# Patient Record
Sex: FEMALE | Race: OTHER | Employment: STUDENT | URBAN - METROPOLITAN AREA
[De-identification: names, ages, dates, MRNs, and addresses within clinical notes are randomized per-mention and may not be internally consistent; named-entity substitution may affect disease eponyms.]

---

## 2024-10-19 ENCOUNTER — APPOINTMENT (EMERGENCY)
Dept: RADIOLOGY | Facility: HOSPITAL | Age: 19
End: 2024-10-19
Payer: COMMERCIAL

## 2024-10-19 ENCOUNTER — HOSPITAL ENCOUNTER (EMERGENCY)
Facility: HOSPITAL | Age: 19
Discharge: HOME | End: 2024-10-19
Payer: COMMERCIAL

## 2024-10-19 VITALS
DIASTOLIC BLOOD PRESSURE: 84 MMHG | TEMPERATURE: 98.7 F | RESPIRATION RATE: 20 BRPM | HEART RATE: 134 BPM | OXYGEN SATURATION: 96 % | SYSTOLIC BLOOD PRESSURE: 145 MMHG

## 2024-10-19 DIAGNOSIS — J18.9 PNEUMONIA: Primary | ICD-10-CM

## 2024-10-19 DIAGNOSIS — R05.9 COUGH: ICD-10-CM

## 2024-10-19 LAB
ALBUMIN SERPL BCG-MCNC: 4.3 G/DL (ref 3.5–5)
ALP SERPL-CCNC: 72 U/L (ref 34–104)
ALT SERPL W P-5'-P-CCNC: 17 U/L (ref 7–52)
ANION GAP SERPL CALCULATED.3IONS-SCNC: 12 MMOL/L (ref 4–13)
AST SERPL W P-5'-P-CCNC: 16 U/L (ref 13–39)
BASOPHILS # BLD AUTO: 0.01 THOUSANDS/ΜL (ref 0–0.1)
BASOPHILS NFR BLD AUTO: 0 % (ref 0–1)
BILIRUB SERPL-MCNC: 0.28 MG/DL (ref 0.2–1)
BUN SERPL-MCNC: 8 MG/DL (ref 5–25)
CALCIUM SERPL-MCNC: 9.3 MG/DL (ref 8.4–10.2)
CHLORIDE SERPL-SCNC: 103 MMOL/L (ref 96–108)
CO2 SERPL-SCNC: 21 MMOL/L (ref 21–32)
CREAT SERPL-MCNC: 0.52 MG/DL (ref 0.6–1.3)
EOSINOPHIL # BLD AUTO: 0.01 THOUSAND/ΜL (ref 0–0.61)
EOSINOPHIL NFR BLD AUTO: 0 % (ref 0–6)
ERYTHROCYTE [DISTWIDTH] IN BLOOD BY AUTOMATED COUNT: 12.2 % (ref 11.6–15.1)
EXT PREGNANCY TEST URINE: NEGATIVE
EXT. CONTROL: NORMAL
FLUAV RNA RESP QL NAA+PROBE: NEGATIVE
FLUBV RNA RESP QL NAA+PROBE: NEGATIVE
GFR SERPL CREATININE-BSD FRML MDRD: 137 ML/MIN/1.73SQ M
GLUCOSE SERPL-MCNC: 107 MG/DL (ref 65–140)
HCT VFR BLD AUTO: 43.2 % (ref 34.8–46.1)
HGB BLD-MCNC: 14.5 G/DL (ref 11.5–15.4)
IMM GRANULOCYTES # BLD AUTO: 0.01 THOUSAND/UL (ref 0–0.2)
IMM GRANULOCYTES NFR BLD AUTO: 0 % (ref 0–2)
LYMPHOCYTES # BLD AUTO: 1.33 THOUSANDS/ΜL (ref 0.6–4.47)
LYMPHOCYTES NFR BLD AUTO: 18 % (ref 14–44)
MCH RBC QN AUTO: 29.7 PG (ref 26.8–34.3)
MCHC RBC AUTO-ENTMCNC: 33.6 G/DL (ref 31.4–37.4)
MCV RBC AUTO: 89 FL (ref 82–98)
MONOCYTES # BLD AUTO: 0.48 THOUSAND/ΜL (ref 0.17–1.22)
MONOCYTES NFR BLD AUTO: 6 % (ref 4–12)
NEUTROPHILS # BLD AUTO: 5.76 THOUSANDS/ΜL (ref 1.85–7.62)
NEUTS SEG NFR BLD AUTO: 76 % (ref 43–75)
NRBC BLD AUTO-RTO: 0 /100 WBCS
PLATELET # BLD AUTO: 336 THOUSANDS/UL (ref 149–390)
PMV BLD AUTO: 9.4 FL (ref 8.9–12.7)
POTASSIUM SERPL-SCNC: 3.7 MMOL/L (ref 3.5–5.3)
PROT SERPL-MCNC: 8 G/DL (ref 6.4–8.4)
RBC # BLD AUTO: 4.88 MILLION/UL (ref 3.81–5.12)
RSV RNA RESP QL NAA+PROBE: NEGATIVE
SARS-COV-2 RNA RESP QL NAA+PROBE: NEGATIVE
SODIUM SERPL-SCNC: 136 MMOL/L (ref 135–147)
WBC # BLD AUTO: 7.6 THOUSAND/UL (ref 4.31–10.16)

## 2024-10-19 PROCEDURE — 85025 COMPLETE CBC W/AUTO DIFF WBC: CPT

## 2024-10-19 PROCEDURE — 93005 ELECTROCARDIOGRAM TRACING: CPT

## 2024-10-19 PROCEDURE — 0241U HB NFCT DS VIR RESP RNA 4 TRGT: CPT

## 2024-10-19 PROCEDURE — 96375 TX/PRO/DX INJ NEW DRUG ADDON: CPT

## 2024-10-19 PROCEDURE — 71046 X-RAY EXAM CHEST 2 VIEWS: CPT

## 2024-10-19 PROCEDURE — 81025 URINE PREGNANCY TEST: CPT

## 2024-10-19 PROCEDURE — 36415 COLL VENOUS BLD VENIPUNCTURE: CPT

## 2024-10-19 PROCEDURE — 80053 COMPREHEN METABOLIC PANEL: CPT

## 2024-10-19 PROCEDURE — 96365 THER/PROPH/DIAG IV INF INIT: CPT

## 2024-10-19 PROCEDURE — 99284 EMERGENCY DEPT VISIT MOD MDM: CPT

## 2024-10-19 PROCEDURE — 99285 EMERGENCY DEPT VISIT HI MDM: CPT

## 2024-10-19 RX ORDER — DOXYCYCLINE 100 MG/1
100 CAPSULE ORAL 2 TIMES DAILY
Qty: 14 CAPSULE | Refills: 0 | Status: SHIPPED | OUTPATIENT
Start: 2024-10-19 | End: 2024-10-26

## 2024-10-19 RX ORDER — ONDANSETRON 2 MG/ML
4 INJECTION INTRAMUSCULAR; INTRAVENOUS ONCE
Status: COMPLETED | OUTPATIENT
Start: 2024-10-19 | End: 2024-10-19

## 2024-10-19 RX ORDER — SODIUM CHLORIDE, SODIUM GLUCONATE, SODIUM ACETATE, POTASSIUM CHLORIDE, MAGNESIUM CHLORIDE, SODIUM PHOSPHATE, DIBASIC, AND POTASSIUM PHOSPHATE .53; .5; .37; .037; .03; .012; .00082 G/100ML; G/100ML; G/100ML; G/100ML; G/100ML; G/100ML; G/100ML
1000 INJECTION, SOLUTION INTRAVENOUS ONCE
Status: COMPLETED | OUTPATIENT
Start: 2024-10-19 | End: 2024-10-19

## 2024-10-19 RX ADMIN — ONDANSETRON 4 MG: 2 INJECTION INTRAMUSCULAR; INTRAVENOUS at 22:48

## 2024-10-19 RX ADMIN — DEXAMETHASONE 10 MG: 4 TABLET ORAL at 22:48

## 2024-10-19 RX ADMIN — SODIUM CHLORIDE, SODIUM GLUCONATE, SODIUM ACETATE, POTASSIUM CHLORIDE, MAGNESIUM CHLORIDE, SODIUM PHOSPHATE, DIBASIC, AND POTASSIUM PHOSPHATE 1000 ML: .53; .5; .37; .037; .03; .012; .00082 INJECTION, SOLUTION INTRAVENOUS at 22:48

## 2024-10-20 LAB
ATRIAL RATE: 132 BPM
P AXIS: 36 DEGREES
PR INTERVAL: 128 MS
QRS AXIS: 73 DEGREES
QRSD INTERVAL: 86 MS
QT INTERVAL: 290 MS
QTC INTERVAL: 429 MS
T WAVE AXIS: 9 DEGREES
VENTRICULAR RATE: 132 BPM

## 2024-10-20 PROCEDURE — 93010 ELECTROCARDIOGRAM REPORT: CPT | Performed by: INTERNAL MEDICINE

## 2024-10-20 NOTE — ED ATTENDING ATTESTATION
10/19/2024  I, Annette Maria Palladino, DO, saw and evaluated the patient. I have discussed the patient with the resident/non-physician practitioner and agree with the resident's/non-physician practitioner's findings, Plan of Care, and MDM as documented in the resident's/non-physician practitioner's note, except where noted. All available labs and Radiology studies were reviewed.  I was present for key portions of any procedure(s) performed by the resident/non-physician practitioner and I was immediately available to provide assistance.       At this point I agree with the current assessment done in the Emergency Department.  I have conducted an independent evaluation of this patient a history and physical is as follows:      Emergency Department Note- Beatrzi Bay 19 y.o. female MRN: [id number]    Unit/Bed#: Z6HA Encounter: [id number]  Chief Complaint   Patient presents with    Cough     Pt. Reports a cough for the past week. Reports a coughing fit an hour ago and coughed up blood, now feeling lightheaded. On and off fevers, +N/V/D.          History of Present Illness   HPI:  Beatriz Bay is a 19 y.o. female who presents for evaluation of URI-like symptoms.  Patient notes her cough for the past week.  Patient notes that had a coughing fit an hour ago and coughed up blood-tinged sputum.  Patient notes feeling slightly lightheaded.  On and off fevers over the past week.  Positive nausea vomiting as well as diarrhea.  Has been eating and drinking normally.  Denies any dysuria or hematuria.  Patient notes last menstrual period was last week.  Denies any chance of pregnancy.  Patient denies any sore throat.    Review of Systems    Historical Information   Past Medical History:   Diagnosis Date    Anemia      History reviewed. No pertinent surgical history.  Social History   Social History     Substance and Sexual Activity   Alcohol Use None     Social History     Substance and Sexual  Activity   Drug Use Not on file     Social History     Tobacco Use   Smoking Status Not on file   Smokeless Tobacco Not on file     Family History: Family history non-contributory    Meds/Allergies   all medications and allergies reviewed  No Known Allergies    Objective   First Vitals:   Blood Pressure: 145/84 (10/19/24 2109)  Pulse: (!) 134 (10/19/24 2109)  Temperature: 98.7 °F (37.1 °C) (10/19/24 2108)  Temp Source: Tympanic (10/19/24 2108)  Respirations: 20 (10/19/24 2109)  SpO2: 96 % (10/19/24 2109)    Current Vitals:   Blood Pressure: 145/84 (10/19/24 2109)  Pulse: (!) 134 (10/19/24 2109)  Temperature: 98.7 °F (37.1 °C) (10/19/24 2108)  Temp Source: Tympanic (10/19/24 2108)  Respirations: 20 (10/19/24 2109)  SpO2: 96 % (10/19/24 2109)    No intake or output data in the 24 hours ending 10/19/24 2252    Invasive Devices       Peripheral Intravenous Line  Duration             Peripheral IV 10/19/24 Left;Proximal;Ventral (anterior) Forearm <1 day                    Physical Exam      Medical Decision Making:   Viral illness - symptoms consistent with upper respiratory infection, likely viral in etiology. Clinically well hydrated on arrival. No signs of respiratory distress. Discussed nasal clearance. May use saline spray. Tylenol and motrin recommended as needed for fever. Encourage fluids.  Screening labs obtained.  Point-of-care pregnancy obtained Covid FLU RSV pending. Advised to follow up with PCP in a few days for re-evaluation. OTC medications recommended for symptomatic relief. Return precautions given. Patient tolerating PO.  All questions and concerns answered. Stable for discharge.      Recent Results (from the past 36 hour(s))   ECG 12 lead    Collection Time: 10/19/24  9:11 PM   Result Value Ref Range    Ventricular Rate 132 BPM    Atrial Rate 132 BPM    NJ Interval 128 ms    QRSD Interval 86 ms    QT Interval 290 ms    QTC Interval 429 ms    P Axis 36 degrees    QRS Axis 73 degrees    T Wave Axis 9  "degrees     XR chest 2 views    (Results Pending)         Portions of the record may have been created with voice recognition software. Occasional wrong word or \"sound a like\" substitutions may have occurred due to the inherent limitations of voice recognition software.  Read the chart carefully and recognize, using context, where substitutions have occurred.      Critical Care Time  Procedures      "

## 2024-10-20 NOTE — ED PROVIDER NOTES
Chief Complaint   Patient presents with    Cough     Pt. Reports a cough for the past week. Reports a coughing fit an hour ago and coughed up blood, now feeling lightheaded. On and off fevers, +N/V/D.      History of Present Illness and Review of Systems   This is a 19 y.o. female with no significant past medical history comes in for evaluation of hematemesis that occurred after a posttussive fit.  The patient states that she has been under the weather for the past few days.  She has been nauseous and vomiting however she has not had any blood up to this point.  The patient states that there was only a small amount of blood about a tablespoon that was seen in the vomit.  Patient did not eat any red-tinged food prior to the emesis.  She denies chest pain or shortness of breath at this time.  No other complaints for this encounter.    Remainder of ROS Reviewed and Non-Pertinent    Past Medical, Past Surgical History:    has a past medical history of Anemia.   has no past surgical history on file.     Allergies:   No Known Allergies    Social and Family History:     Social History     Substance and Sexual Activity   Alcohol Use None     Social History     Tobacco Use   Smoking Status Not on file   Smokeless Tobacco Not on file     Social History     Substance and Sexual Activity   Drug Use Not on file       Physical Examination     Vitals:    10/19/24 2108 10/19/24 2109   BP:  145/84   BP Location:  Left arm   Pulse:  (!) 134   Resp:  20   Temp: 98.7 °F (37.1 °C)    TempSrc: Tympanic    SpO2:  96%       Physical Exam  Vitals and nursing note reviewed.   Constitutional:       General: She is not in acute distress.     Appearance: She is well-developed.   HENT:      Head: Normocephalic and atraumatic.   Eyes:      Conjunctiva/sclera: Conjunctivae normal.   Cardiovascular:      Rate and Rhythm: Regular rhythm. Tachycardia present.      Heart sounds: No murmur heard.  Pulmonary:      Effort: Pulmonary effort is normal. No  respiratory distress.      Breath sounds: Normal breath sounds. No wheezing, rhonchi or rales.   Chest:      Chest wall: No tenderness.   Abdominal:      Palpations: Abdomen is soft.      Tenderness: There is no abdominal tenderness.   Musculoskeletal:         General: No swelling.      Cervical back: Neck supple.   Skin:     General: Skin is warm and dry.      Capillary Refill: Capillary refill takes less than 2 seconds.   Neurological:      Mental Status: She is alert.   Psychiatric:         Mood and Affect: Mood normal.           Procedures   Procedures      MDM:   Medical Decision Making  Amount and/or Complexity of Data Reviewed  Labs: ordered.  Radiology: ordered and independent interpretation performed.    Risk  Prescription drug management.        19-year-old female comes in for evaluation of upper respiratory like symptoms with cough over the past week that led to a posttussive hematemesis.  On examination, the patient's heart lungs are clear to auscultation abdomen is soft and nontender patient neurologically intact.  Last menstrual period last week.  Denies any chance of pregnancy.    Will get a x-ray to rule out Boerhaave syndrome.  Patient likely suffering from minor Sofia-Montilla tear.  Will get CBC CMP to make sure there are not any electrolyte abnormalities due to her vomiting and a CBC to look for leukocytosis.  Patient will be given supportive treatment with Decadron Zofran and fluids.    On reassessment of the chest x-ray there is possible infiltrate starting which may be new forming pneumonia.  Patient will be discharged on a short course of doxycycline      Final Dispo   Final Diagnosis:  1. Pneumonia    2. Cough      Time reflects when diagnosis was documented in both MDM as applicable and the Disposition within this note       Time User Action Codes Description Comment    10/19/2024 11:24 PM Rao Turner Add [J18.9] Pneumonia     10/19/2024 11:24 PM Rao Turner Add [R05.9] Cough            ED Disposition       ED Disposition   Discharge    Condition   Stable    Date/Time   Sat Oct 19, 2024 11:24 PM    Comment   Beatriz Bay discharge to home/self care.                   Follow-up Information       Follow up With Specialties Details Why Contact Info    [employer]    [phone]            Medications   dexamethasone (DECADRON) tablet 10 mg (10 mg Oral Given 10/19/24 2248)   multi-electrolyte (ISOLYTE-S PH 7.4) bolus 1,000 mL (0 mL Intravenous Stopped 10/19/24 9898)   ondansetron (ZOFRAN) injection 4 mg (4 mg Intravenous Given 10/19/24 2248)       Risk Stratification Tools                Orders Placed This Encounter   Procedures    FLU/RSV/COVID - if FLU/RSV clinically relevant (2hr TAT)    XR chest 2 views    CBC and differential    Comprehensive metabolic panel    POCT pregnancy, urine    ECG 12 lead    ECG 12 lead       Labs:     Labs Reviewed   CBC AND DIFFERENTIAL - Abnormal       Result Value Ref Range Status    WBC 7.60  4.31 - 10.16 Thousand/uL Final    RBC 4.88  3.81 - 5.12 Million/uL Final    Hemoglobin 14.5  11.5 - 15.4 g/dL Final    Hematocrit 43.2  34.8 - 46.1 % Final    MCV 89  82 - 98 fL Final    MCH 29.7  26.8 - 34.3 pg Final    MCHC 33.6  31.4 - 37.4 g/dL Final    RDW 12.2  11.6 - 15.1 % Final    MPV 9.4  8.9 - 12.7 fL Final    Platelets 336  149 - 390 Thousands/uL Final    nRBC 0  /100 WBCs Final    Segmented % 76 (*) 43 - 75 % Final    Immature Grans % 0  0 - 2 % Final    Lymphocytes % 18  14 - 44 % Final    Monocytes % 6  4 - 12 % Final    Eosinophils Relative 0  0 - 6 % Final    Basophils Relative 0  0 - 1 % Final    Absolute Neutrophils 5.76  1.85 - 7.62 Thousands/µL Final    Absolute Immature Grans 0.01  0.00 - 0.20 Thousand/uL Final    Absolute Lymphocytes 1.33  0.60 - 4.47 Thousands/µL Final    Absolute Monocytes 0.48  0.17 - 1.22 Thousand/µL Final    Eosinophils Absolute 0.01  0.00 - 0.61 Thousand/µL Final    Basophils Absolute 0.01  0.00 - 0.10 Thousands/µL  Final   COMPREHENSIVE METABOLIC PANEL - Abnormal    Sodium 136  135 - 147 mmol/L Final    Potassium 3.7  3.5 - 5.3 mmol/L Final    Chloride 103  96 - 108 mmol/L Final    CO2 21  21 - 32 mmol/L Final    ANION GAP 12  4 - 13 mmol/L Final    BUN 8  5 - 25 mg/dL Final    Creatinine 0.52 (*) 0.60 - 1.30 mg/dL Final    Comment: Standardized to IDMS reference method    Glucose 107  65 - 140 mg/dL Final    Comment: If the patient is fasting, the ADA then defines impaired fasting glucose as > 100 mg/dL and diabetes as > or equal to 123 mg/dL.    Calcium 9.3  8.4 - 10.2 mg/dL Final    AST 16  13 - 39 U/L Final    ALT 17  7 - 52 U/L Final    Comment: Specimen collection should occur prior to Sulfasalazine administration due to the potential for falsely depressed results.     Alkaline Phosphatase 72  34 - 104 U/L Final    Total Protein 8.0  6.4 - 8.4 g/dL Final    Albumin 4.3  3.5 - 5.0 g/dL Final    Total Bilirubin 0.28  0.20 - 1.00 mg/dL Final    Comment: Use of this assay is not recommended for patients undergoing treatment with eltrombopag due to the potential for falsely elevated results.  N-acetyl-p-benzoquinone imine (metabolite of Acetaminophen) will generate erroneously low results in samples for patients that have taken an overdose of Acetaminophen.    eGFR 137  ml/min/1.73sq m Final    Narrative:     National Kid[employer] Foundation guidelines for Chronic Kidney Disease (CKD):     Stage 1 with normal or high GFR (GFR > 90 mL/min/1.73 square meters)    Stage 2 Mild CKD (GFR = 60-89 mL/min/1.73 square meters)    Stage 3A Moderate CKD (GFR = 45-59 mL/min/1.73 square meters)    Stage 3B Moderate CKD (GFR = 30-44 mL/min/1.73 square meters)    Stage 4 Severe CKD (GFR = 15-29 mL/min/1.73 square meters)    Stage 5 End Stage CKD (GFR <15 mL/min/1.73 square meters)  Note: GFR calculation is accurate only with a steady state creatinine   POCT PREGNANCY, URINE - Normal    EXT Preg Test, Ur Negative   Final    Control Valid    "Final       Imaging:     XR chest 2 views   ED Interpretation by Annette Maria Palladino, DO (10/19 9296)   Viral appearing, bronchitis.        Final Result by Suzanna Durant MD (10/20 0635)      No acute cardiopulmonary disease.            Workstation performed: [id number]            All details of the evaluation and treatment plan were made clear and additionally all questions and concerns were addressed while under my care.    Portions of the record may have been created with voice recognition software. Occasional wrong word or \"sound a like\" substitutions may have occurred due to the inherent limitations of voice recognition software. Read the chart carefully and recognize, using context, where substitutions have occurred.    The attending physician physically available and evaluated the above patient alongside myself.      Rao Turner MD  10/20/24 3534    "

## 2024-10-25 ENCOUNTER — HOSPITAL ENCOUNTER (EMERGENCY)
Facility: HOSPITAL | Age: 19
Discharge: HOME | End: 2024-10-25
Attending: EMERGENCY MEDICINE
Payer: COMMERCIAL

## 2024-10-25 VITALS
RESPIRATION RATE: 17 BRPM | SYSTOLIC BLOOD PRESSURE: 135 MMHG | TEMPERATURE: 97.8 F | DIASTOLIC BLOOD PRESSURE: 64 MMHG | OXYGEN SATURATION: 95 % | HEART RATE: 97 BPM

## 2024-10-25 DIAGNOSIS — R05.9 COUGH: Primary | ICD-10-CM

## 2024-10-25 DIAGNOSIS — R11.0 NAUSEA: ICD-10-CM

## 2024-10-25 PROCEDURE — 99283 EMERGENCY DEPT VISIT LOW MDM: CPT

## 2024-10-25 PROCEDURE — 96365 THER/PROPH/DIAG IV INF INIT: CPT

## 2024-10-25 PROCEDURE — 96375 TX/PRO/DX INJ NEW DRUG ADDON: CPT

## 2024-10-25 PROCEDURE — 93005 ELECTROCARDIOGRAM TRACING: CPT

## 2024-10-25 PROCEDURE — 99284 EMERGENCY DEPT VISIT MOD MDM: CPT | Performed by: EMERGENCY MEDICINE

## 2024-10-25 RX ORDER — ONDANSETRON 2 MG/ML
4 INJECTION INTRAMUSCULAR; INTRAVENOUS ONCE
Status: COMPLETED | OUTPATIENT
Start: 2024-10-25 | End: 2024-10-25

## 2024-10-25 RX ORDER — ONDANSETRON 4 MG/1
4 TABLET, FILM COATED ORAL EVERY 6 HOURS
Qty: 15 TABLET | Refills: 0 | Status: SHIPPED | OUTPATIENT
Start: 2024-10-25

## 2024-10-25 RX ADMIN — SODIUM CHLORIDE, SODIUM LACTATE, POTASSIUM CHLORIDE, AND CALCIUM CHLORIDE 1000 ML: .6; .31; .03; .02 INJECTION, SOLUTION INTRAVENOUS at 13:09

## 2024-10-25 RX ADMIN — ONDANSETRON 4 MG: 2 INJECTION INTRAMUSCULAR; INTRAVENOUS at 13:09

## 2024-10-25 NOTE — ED ATTENDING ATTESTATION
10/25/2024  I, Luis Jacob DO, saw and evaluated the patient. I have discussed the patient with the resident/non-physician practitioner and agree with the resident's/non-physician practitioner's findings, Plan of Care, and MDM as documented in the resident's/non-physician practitioner's note, except where noted. All available labs and Radiology studies were reviewed.  I was present for key portions of any procedure(s) performed by the resident/non-physician practitioner and I was immediately available to provide assistance.       At this point I agree with the current assessment done in the Emergency Department.  I have conducted an independent evaluation of this patient a history and physical is as follows:    19-year-old woman presents for evaluation of continued cough with intermittent hemoptysis, episode of clear emesis in the mornings she is being treated with doxycycline for pneumonia diagnosed on October 19, 2024.  She has 1 day left of doxycycline.  She states her cough is improving no other new complaints denies fever.    Lungs are clear to auscultation bilaterally    Impression: Pneumonia, likely postviral cough and nausea/vomiting caused by doxycycline plan: IV fluids, discharged home, reassurance      ED Course         Critical Care Time  Procedures

## 2024-10-25 NOTE — ED PROVIDER NOTES
"Time reflects when diagnosis was documented in both MDM as applicable and the Disposition within this note       Time User Action Codes Description Comment    10/25/2024  1:45 PM Rufus Gibbs Add [R05.9] Cough     10/25/2024  1:45 PM Rufus Gibbs Add [R11.0] Nausea           ED Disposition       ED Disposition   Discharge    Condition   Stable    Date/Time   Fri Oct 25, 2024  1:45 PM    Comment   Beatriz Bay discharge to home/self care.                   Assessment & Plan       Medical Decision Making  Patient signs and symptoms likely due to starting her new doxycycline medication.  The small amount of red-tinged vomit is nonconcerning and she has been vomiting every single day for multiple days and this could be post viral syndrome, small Sofia-Montilla tear, bronchial irritation due to her cough.  All of which are nonconcerning and showed be self resolving.  Good return precautions noted.  Patient states she felt better with fluids and Zofran.  Some Zofran was prescribed to her to manage her symptoms and improve her diet.  Good return precautions noted.  Patient agreeable stable for discharge.    Risk  Prescription drug management.        ED Course as of 10/25/24 1355   Fri Oct 25, 2024   1350 Patient states that she feels better after fluids and Zofran.       Medications   lactated ringers bolus 1,000 mL (0 mL Intravenous Stopped 10/25/24 1354)   ondansetron (ZOFRAN) injection 4 mg (4 mg Intravenous Given 10/25/24 1309)       ED Risk Strat Scores                                               History of Present Illness       Chief Complaint   Patient presents with    Cough     Pt was diagnosed with pneumonia last week and had episodes of \"throwing up bright red blood\". Pt returned as she is unsure if meds are working and had 1 episode of a small amount BRB in vomit. Pt denies any abdominal pain and fevers.        Past Medical History:   Diagnosis Date    Anemia       History reviewed. No " pertinent surgical history.   History reviewed. No pertinent family history.   Social History     Tobacco Use    Smoking status: Never    Smokeless tobacco: Never   Substance Use Topics    Alcohol use: Not Currently    Drug use: Never      E-Cigarette/Vaping      E-Cigarette/Vaping Substances      I have reviewed and agree with the history as documented.     Patient is a 19-year-old female was recently diagnosed with pneumonia and started on doxycycline.  She comes in today complaining of morning vomiting that started after she started her doxycycline.  She states that her cough is getting getting progressively better and is almost gone.  Denies any fevers.  Denies any shortness of breath.  Patient denies any history of GERD, peptic ulcer disease, gastritis, alcohol abuse.  She states that she feels nauseous she states that she has been able to tolerate p.o. intake however she does not feel like she has been eating 100% and feels like she has been eating a bland diet.          Review of Systems   Constitutional:  Negative for fever.   Respiratory:  Positive for cough. Negative for shortness of breath.    Cardiovascular:  Negative for chest pain and palpitations.   Gastrointestinal:  Positive for nausea and vomiting. Negative for abdominal pain.   Genitourinary:  Negative for dysuria and hematuria.   Skin:  Negative for rash.   Neurological:  Negative for syncope.   All other systems reviewed and are negative.          Objective       ED Triage Vitals [10/25/24 1135]   Temperature Pulse Blood Pressure Respirations SpO2 Patient Position - Orthostatic VS   97.8 °F (36.6 °C) (!) 119 135/64 17 95 % --      Temp Source Heart Rate Source BP Location FiO2 (%) Pain Score    Temporal Monitor Left arm -- No Pain      Vitals      Date and Time Temp Pulse SpO2 Resp BP Pain Score FACES Pain Rating User   10/25/24 1310 -- 97 -- -- -- -- -- MK   10/25/24 1135 97.8 °F (36.6 °C) 119 95 % 17 135/64 No Pain -- MO            Physical  Exam  Vitals and nursing note reviewed.   Constitutional:       General: She is not in acute distress.     Appearance: She is well-developed.   HENT:      Head: Normocephalic and atraumatic.   Eyes:      Conjunctiva/sclera: Conjunctivae normal.   Cardiovascular:      Rate and Rhythm: Normal rate and regular rhythm.      Heart sounds: No murmur heard.  Pulmonary:      Effort: Pulmonary effort is normal. No respiratory distress.      Breath sounds: Normal breath sounds.   Abdominal:      Palpations: Abdomen is soft.      Tenderness: There is no abdominal tenderness.   Musculoskeletal:         General: No swelling.      Cervical back: Neck supple.   Skin:     General: Skin is warm and dry.      Capillary Refill: Capillary refill takes less than 2 seconds.   Neurological:      Mental Status: She is alert.   Psychiatric:         Mood and Affect: Mood normal.         Results Reviewed       None            No orders to display       Procedures    ED Medication and Procedure Management   Prior to Admission Medications   Prescriptions Last Dose Informant Patient Reported? Taking?   doxycycline hyclate (VIBRAMYCIN) 100 mg capsule   No No   Sig: Take 1 capsule (100 mg total) by mouth 2 (two) times a day for 7 days      Facility-Administered Medications: None     Current Discharge Medication List        START taking these medications    Details   ondansetron (ZOFRAN) 4 mg tablet Take 1 tablet (4 mg total) by mouth every 6 (six) hours  Qty: 15 tablet, Refills: 0    Associated Diagnoses: Nausea           CONTINUE these medications which have NOT CHANGED    Details   doxycycline hyclate (VIBRAMYCIN) 100 mg capsule Take 1 capsule (100 mg total) by mouth 2 (two) times a day for 7 days  Qty: 14 capsule, Refills: 0    Associated Diagnoses: Pneumonia           No discharge procedures on file.  ED SEPSIS DOCUMENTATION   Time reflects when diagnosis was documented in both MDM as applicable and the Disposition within this note       Time  User Action Codes Description Comment    10/25/2024  1:45 PM Rufus Gibbs Add [R05.9] Cough     10/25/2024  1:45 PM Rufus Gibbs Add [R11.0] Nausea                  Rufus Gibbs DO  10/25/24 2026

## 2024-10-25 NOTE — DISCHARGE INSTRUCTIONS
You may stop taking the doxycycline.  Please follow-up with your primary care physician regarding emergency department visit.  Please return emergency department if you have any other symptoms concerning to you.

## 2024-10-28 LAB
ATRIAL RATE: 105 BPM
P AXIS: 56 DEGREES
PR INTERVAL: 138 MS
QRS AXIS: 61 DEGREES
QRSD INTERVAL: 86 MS
QT INTERVAL: 330 MS
QTC INTERVAL: 436 MS
T WAVE AXIS: 49 DEGREES
VENTRICULAR RATE: 105 BPM

## 2024-10-28 PROCEDURE — 93010 ELECTROCARDIOGRAM REPORT: CPT | Performed by: INTERNAL MEDICINE

## 2024-11-10 ENCOUNTER — HOSPITAL ENCOUNTER (EMERGENCY)
Facility: HOSPITAL | Age: 19
Discharge: HOME | End: 2024-11-10
Attending: EMERGENCY MEDICINE | Admitting: EMERGENCY MEDICINE
Payer: COMMERCIAL

## 2024-11-10 VITALS
OXYGEN SATURATION: 98 % | SYSTOLIC BLOOD PRESSURE: 136 MMHG | RESPIRATION RATE: 20 BRPM | DIASTOLIC BLOOD PRESSURE: 88 MMHG | HEIGHT: 64 IN | TEMPERATURE: 98 F | HEART RATE: 101 BPM

## 2024-11-10 DIAGNOSIS — R14.0 BLOATING: ICD-10-CM

## 2024-11-10 DIAGNOSIS — T43.615A ADVERSE EFFECT OF CAFFEINE, INITIAL ENCOUNTER: ICD-10-CM

## 2024-11-10 DIAGNOSIS — R00.2 PALPITATIONS: Primary | ICD-10-CM

## 2024-11-10 DIAGNOSIS — R00.0 TACHYCARDIA: ICD-10-CM

## 2024-11-10 LAB
ATRIAL RATE: 105 BPM
P AXIS: 39 DEGREES
PR INTERVAL: 126 MS
QRS AXIS: 31 DEGREES
QRSD INTERVAL: 76 MS
QT INTERVAL: 306 MS
QTC INTERVAL: 405 MS
T WAVE AXIS: 27 DEGREES
VENTRICULAR RATE: 105 BPM

## 2024-11-10 PROCEDURE — 99285 EMERGENCY DEPT VISIT HI MDM: CPT

## 2024-11-10 PROCEDURE — 93005 ELECTROCARDIOGRAM TRACING: CPT

## 2024-11-10 PROCEDURE — 93010 ELECTROCARDIOGRAM REPORT: CPT | Performed by: INTERNAL MEDICINE

## 2024-11-10 PROCEDURE — 99284 EMERGENCY DEPT VISIT MOD MDM: CPT | Performed by: EMERGENCY MEDICINE

## 2024-11-10 RX ORDER — DICYCLOMINE HCL 20 MG
20 TABLET ORAL 2 TIMES DAILY
Qty: 20 TABLET | Refills: 0 | Status: SHIPPED | OUTPATIENT
Start: 2024-11-10

## 2024-11-10 NOTE — ED PROVIDER NOTES
Time reflects when diagnosis was documented in both MDM as applicable and the Disposition within this note       Time User Action Codes Description Comment    11/10/2024  2:07 PM Reynaldo Campos [R00.2] Palpitations     11/10/2024  2:08 PM Reynaldo Campos [R00.0] Tachycardia     11/10/2024  2:08 PM Reynaldo Campos [T43.615A] Adverse effect of caffeine, initial encounter     11/10/2024  2:49 PM Reynaldo Campos [R14.0] Bloating           ED Disposition       ED Disposition   Discharge    Condition   Stable    Date/Time   Sun Nov 10, 2024  2:22 PM    Comment   Beatriz Bay discharge to home/self care.                   Assessment & Plan       Medical Decision Making  Patient is a 19 y.o. female with PMH of nothing pertinent who presents to the ED with complaint of elevated heart rate    Vital signs on arrival pulse of 101, otherwise vital signs within normal limits    On exam patient is alert, oriented, no evidence of respiratory distress, lungs clear to auscultation, no evidence of rubs gallops or murmurs, abdomen is soft, nondistended, and nontender, no evidence of rash or skin change    History and physical exam most consistent with tachycardia secondary to caffeine intake/intolerance, however, differential diagnosis included but not limited to viral upper respiratory illness, reflux disease, plan ECG, reassessment/observation    View ED course above for further discussion on patient workup.     Patient's ECG demonstrates sinus tachycardia without findings concerning for malignant arrhythmia or ACS    All labs reviewed and utilized in the medical decision making process  All radiology studies independently viewed by me and interpreted by the radiologist.  I reviewed all testing with the patient.     Upon re-evaluation Patient continues remain hemodynamically stable with no new symptom/complaint, patient states she is not actively having chest pain at this time, has had intermittent palpitations,  still feels that her heart rate is elevated, but otherwise feels well, requests a home prescription for gas pain as she has been increasingly gassy lately, prescription provided for Bentyl, again discussed cardiology follow-up with the patient for continued symptoms of tachycardia and palpitations    Plan for care discussed with patient, patient verbalized understanding, educated on symptoms concerning for return to the emergency department, PCP follow-up recommended.    Risk  Prescription drug management.             Medications - No data to display    ED Risk Strat Scores                           SBIRT 22yo+      Flowsheet Row Most Recent Value   Initial Alcohol Screen: US AUDIT-C     1. How often do you have a drink containing alcohol? 0 Filed at: 11/10/2024 1406   2. How many drinks containing alcohol do you have on a typical day you are drinking?  0 Filed at: 11/10/2024 1406   3b. FEMALE Any Age, or MALE 65+: How often do you have 4 or more drinks on one occassion? 0 Filed at: 11/10/2024 1406   Audit-C Score 0 Filed at: 11/10/2024 1406   BAL: How many times in the past year have you...    Used an illegal drug or used a prescription medication for non-medical reasons? Never Filed at: 11/10/2024 1406                            History of Present Illness       Chief Complaint   Patient presents with    Irregular Heart Beat     Pt believes she was given reg coffee not decaf because her heart feels like its beating fast.        Past Medical History:   Diagnosis Date    Anemia       History reviewed. No pertinent surgical history.   History reviewed. No pertinent family history.   Social History     Tobacco Use    Smoking status: Never    Smokeless tobacco: Never   Vaping Use    Vaping status: Never Used   Substance Use Topics    Alcohol use: Not Currently    Drug use: Never      E-Cigarette/Vaping    E-Cigarette Use Never User       E-Cigarette/Vaping Substances      I have reviewed and agree with the history as  documented.     Patient reporting that she has an intolerance to caffeine, frequently deals with feelings of palpitations when her heart rate is elevated, states that she was given a decaf coffee this morning that she thinks was actually a regular coffee, states that shortly after drinking that she began to have an elevated heart rate uncomfortable with that, reports chest pain x 2, last chest pain about 30 minutes prior to arrival, reports the pain is sharp, midsternal, reports that at this time she still feels that her heart rate is fast but she is no longer having chest pain, no difficulty breathing, no shortness of breath, no sore throat/cough/congestion, no abdominal pain nausea or vomiting, has not seen a cardiologist in the past states that she has a referral to cardiology back home in December, does not have insurance cover cardiology here        Review of Systems        Objective       ED Triage Vitals [11/10/24 1306]   Temperature Pulse Blood Pressure Respirations SpO2 Patient Position - Orthostatic VS   98 °F (36.7 °C) 101 136/88 20 98 % --      Temp src Heart Rate Source BP Location FiO2 (%) Pain Score    -- -- -- -- No Pain      Vitals      Date and Time Temp Pulse SpO2 Resp BP Pain Score FACES Pain Rating User   11/10/24 1306 98 °F (36.7 °C) 101 98 % 20 136/88 No Pain -- HK            Physical Exam  Vitals and nursing note reviewed.   Constitutional:       General: She is not in acute distress.     Appearance: She is well-developed. She is not ill-appearing or toxic-appearing.   HENT:      Head: Normocephalic and atraumatic.   Eyes:      Conjunctiva/sclera: Conjunctivae normal.   Cardiovascular:      Rate and Rhythm: Regular rhythm. Tachycardia present.      Heart sounds: No murmur heard.     No friction rub. No gallop.   Pulmonary:      Effort: Pulmonary effort is normal. No respiratory distress.      Breath sounds: Normal breath sounds. No wheezing, rhonchi or rales.   Abdominal:      Palpations:  Abdomen is soft.      Tenderness: There is no abdominal tenderness. There is no guarding or rebound.   Musculoskeletal:         General: No swelling, deformity or signs of injury.      Cervical back: Neck supple.   Skin:     General: Skin is warm and dry.      Capillary Refill: Capillary refill takes less than 2 seconds.      Findings: No erythema or rash.   Neurological:      Mental Status: She is alert.   Psychiatric:         Mood and Affect: Mood normal.         Results Reviewed       None            No orders to display       Procedures    ED Medication and Procedure Management   Prior to Admission Medications   Prescriptions Last Dose Informant Patient Reported? Taking?   ondansetron (ZOFRAN) 4 mg tablet   No No   Sig: Take 1 tablet (4 mg total) by mouth every 6 (six) hours      Facility-Administered Medications: None     Discharge Medication List as of 11/10/2024  2:22 PM        CONTINUE these medications which have NOT CHANGED    Details   ondansetron (ZOFRAN) 4 mg tablet Take 1 tablet (4 mg total) by mouth every 6 (six) hours, Starting Fri 10/25/2024, Normal           No discharge procedures on file.  ED SEPSIS DOCUMENTATION   Time reflects when diagnosis was documented in both MDM as applicable and the Disposition within this note       Time User Action Codes Description Comment    11/10/2024  2:07 PM Reynaldo Campos [R00.2] Palpitations     11/10/2024  2:08 PM Reynaldo Campos [R00.0] Tachycardia     11/10/2024  2:08 PM Reynaldo Campos [T43.615A] Adverse effect of caffeine, initial encounter     11/10/2024  2:49 PM Reynaldo Campos [R14.0] Bloating                  Reynaldo Campos DO  11/11/24 0951

## 2024-11-10 NOTE — ED ATTENDING ATTESTATION
11/10/2024  I, Tanvir Mcdaniel DO, saw and evaluated the patient. I have discussed the patient with the resident/non-physician practitioner and agree with the resident's/non-physician practitioner's findings, Plan of Care, and MDM as documented in the resident's/non-physician practitioner's note, except where noted. All available labs and Radiology studies were reviewed.  I was present for key portions of any procedure(s) performed by the resident/non-physician practitioner and I was immediately available to provide assistance.       At this point I agree with the current assessment done in the Emergency Department.  I have conducted an independent evaluation of this patient a history and physical is as follows:    Patient is a 19-year-old female, with a history of episodic palpitations, says that earlier today she ordered decaf, think she was accidentally given regular coffee because right after drinking and she had some palpitations.  She says she has noticed that she is very sensitive to caffeine and will get palpitations if she has any significant mount of caffeine.  She says the palpitations are improving, she is feeling better, she has no shortness of breath, no dyspnea, no exertional symptoms. Patient denies any prolonged travel history, no recent long travel, no immobilizations, or hospitalizations.    Patient was seen October 19, 2024 in the ED for cough, at that point she had unremarkable CBC, CMP labs.     She says that she is scheduled to see a cardiologist in December back home, she is currently a college student here in the [facility].  She says that she knows the cardiologist in the area here do not participate with her health insurance.    General:  Patient is well-appearing  Head:  Atraumatic  Eyes:  Conjunctiva pink  ENT:  Mucous membranes are moist  Neck:  Supple  Cardiac:  S1-S2, without murmurs  Lungs:  Clear to auscultation bilaterally  Abdomen:  Soft, nontender, normal bowel sounds, no  CVA tenderness, no tympany, no rigidity, no guarding  Extremities:  Normal range of motion, no pedal edema or calf asymmetry, radial pulses are equal and symmetric bilaterally  Neurologic:  Awake, fluent speech, normal comprehension, AAOx3  Skin:  Pink warm and dry  Psychiatric:  Alert, pleasant, cooperative      ED Course  ED Course as of 11/10/24 1422   Sun Nov 10, 2024   1359 ECG performed at 1308 hrs., interpreted by me, sinus rhythm, rate of 105, normal intervals, no delta wave, no acute ischemic or infarctive changes, no acute change from October 25, 2024     Patient had brief episode of palpitations, feels like she is improving.  She has no exertional symptoms at rest and I do not believe she is significantly anemic and I do not believe that repeating laboratory studies are indicated.I do not believe this patient's complaints are from pulmonary embolism and I believe they would most likely be harmed through false positive test results and other complications of testing by further pursuing the diagnosis of pulmonary embolism.    Patient feels comfortable with discharge home with outpatient follow-up and return if there is worsening signs or symptoms.      DIAGNOSIS:  Acute palpitations    MEDICAL DECISION MAKING CODING    COLLECTION AND INTERPRETATION OF DATA  I reviewed prior external notes, including October 25, 2024 ECG    I ordered each unique test  Tests reviewed personally by me:  ECG: See my ED course    Critical Care Time  Procedures

## 2024-11-13 ENCOUNTER — HOSPITAL ENCOUNTER (EMERGENCY)
Facility: HOSPITAL | Age: 19
Discharge: HOME | End: 2024-11-13
Attending: EMERGENCY MEDICINE
Payer: COMMERCIAL

## 2024-11-13 VITALS
DIASTOLIC BLOOD PRESSURE: 65 MMHG | HEART RATE: 103 BPM | TEMPERATURE: 97.5 F | OXYGEN SATURATION: 97 % | RESPIRATION RATE: 18 BRPM | SYSTOLIC BLOOD PRESSURE: 119 MMHG

## 2024-11-13 DIAGNOSIS — R42 LIGHTHEADEDNESS: Primary | ICD-10-CM

## 2024-11-13 DIAGNOSIS — K21.9 GERD (GASTROESOPHAGEAL REFLUX DISEASE): ICD-10-CM

## 2024-11-13 LAB
ANION GAP SERPL CALCULATED.3IONS-SCNC: 8 MMOL/L (ref 4–13)
BACTERIA UR QL AUTO: ABNORMAL /HPF
BASOPHILS # BLD AUTO: 0.02 THOUSANDS/ÂΜL (ref 0–0.1)
BASOPHILS NFR BLD AUTO: 0 % (ref 0–1)
BILIRUB UR QL STRIP: NEGATIVE
BUN SERPL-MCNC: 9 MG/DL (ref 5–25)
CALCIUM SERPL-MCNC: 9.8 MG/DL (ref 8.4–10.2)
CHLORIDE SERPL-SCNC: 104 MMOL/L (ref 96–108)
CLARITY UR: ABNORMAL
CO2 SERPL-SCNC: 26 MMOL/L (ref 21–32)
COLOR UR: ABNORMAL
CREAT SERPL-MCNC: 0.53 MG/DL (ref 0.6–1.3)
EOSINOPHIL # BLD AUTO: 0.03 THOUSAND/ÂΜL (ref 0–0.61)
EOSINOPHIL NFR BLD AUTO: 1 % (ref 0–6)
ERYTHROCYTE [DISTWIDTH] IN BLOOD BY AUTOMATED COUNT: 12.9 % (ref 11.6–15.1)
GFR SERPL CREATININE-BSD FRML MDRD: 136 ML/MIN/1.73SQ M
GLUCOSE SERPL-MCNC: 87 MG/DL (ref 65–140)
GLUCOSE UR STRIP-MCNC: NEGATIVE MG/DL
HCT VFR BLD AUTO: 43.8 % (ref 34.8–46.1)
HGB BLD-MCNC: 14.1 G/DL (ref 11.5–15.4)
HGB UR QL STRIP.AUTO: ABNORMAL
IMM GRANULOCYTES # BLD AUTO: 0.02 THOUSAND/UL (ref 0–0.2)
IMM GRANULOCYTES NFR BLD AUTO: 0 % (ref 0–2)
KETONES UR STRIP-MCNC: ABNORMAL MG/DL
LEUKOCYTE ESTERASE UR QL STRIP: ABNORMAL
LYMPHOCYTES # BLD AUTO: 1.47 THOUSANDS/ÂΜL (ref 0.6–4.47)
LYMPHOCYTES NFR BLD AUTO: 23 % (ref 14–44)
MCH RBC QN AUTO: 29.1 PG (ref 26.8–34.3)
MCHC RBC AUTO-ENTMCNC: 32.2 G/DL (ref 31.4–37.4)
MCV RBC AUTO: 90 FL (ref 82–98)
MONOCYTES # BLD AUTO: 0.48 THOUSAND/ÂΜL (ref 0.17–1.22)
MONOCYTES NFR BLD AUTO: 7 % (ref 4–12)
MUCOUS THREADS UR QL AUTO: ABNORMAL
NEUTROPHILS # BLD AUTO: 4.5 THOUSANDS/ÂΜL (ref 1.85–7.62)
NEUTS SEG NFR BLD AUTO: 69 % (ref 43–75)
NITRITE UR QL STRIP: NEGATIVE
NON-SQ EPI CELLS URNS QL MICRO: ABNORMAL /HPF
NRBC BLD AUTO-RTO: 0 /100 WBCS
PH UR STRIP.AUTO: 5.5 [PH]
PLATELET # BLD AUTO: 286 THOUSANDS/UL (ref 149–390)
PMV BLD AUTO: 10 FL (ref 8.9–12.7)
POTASSIUM SERPL-SCNC: 4.2 MMOL/L (ref 3.5–5.3)
PROT UR STRIP-MCNC: ABNORMAL MG/DL
RBC # BLD AUTO: 4.85 MILLION/UL (ref 3.81–5.12)
RBC #/AREA URNS AUTO: ABNORMAL /HPF
SODIUM SERPL-SCNC: 138 MMOL/L (ref 135–147)
SP GR UR STRIP.AUTO: 1.03 (ref 1–1.03)
UROBILINOGEN UR STRIP-ACNC: <2 MG/DL
WBC # BLD AUTO: 6.52 THOUSAND/UL (ref 4.31–10.16)
WBC #/AREA URNS AUTO: ABNORMAL /HPF

## 2024-11-13 PROCEDURE — 87086 URINE CULTURE/COLONY COUNT: CPT | Performed by: STUDENT IN AN ORGANIZED HEALTH CARE EDUCATION/TRAINING PROGRAM

## 2024-11-13 PROCEDURE — 81001 URINALYSIS AUTO W/SCOPE: CPT | Performed by: STUDENT IN AN ORGANIZED HEALTH CARE EDUCATION/TRAINING PROGRAM

## 2024-11-13 PROCEDURE — 93005 ELECTROCARDIOGRAM TRACING: CPT

## 2024-11-13 PROCEDURE — 36415 COLL VENOUS BLD VENIPUNCTURE: CPT | Performed by: STUDENT IN AN ORGANIZED HEALTH CARE EDUCATION/TRAINING PROGRAM

## 2024-11-13 PROCEDURE — 99285 EMERGENCY DEPT VISIT HI MDM: CPT | Performed by: EMERGENCY MEDICINE

## 2024-11-13 PROCEDURE — 85025 COMPLETE CBC W/AUTO DIFF WBC: CPT | Performed by: STUDENT IN AN ORGANIZED HEALTH CARE EDUCATION/TRAINING PROGRAM

## 2024-11-13 PROCEDURE — 87077 CULTURE AEROBIC IDENTIFY: CPT | Performed by: STUDENT IN AN ORGANIZED HEALTH CARE EDUCATION/TRAINING PROGRAM

## 2024-11-13 PROCEDURE — 99285 EMERGENCY DEPT VISIT HI MDM: CPT

## 2024-11-13 PROCEDURE — 80048 BASIC METABOLIC PNL TOTAL CA: CPT | Performed by: STUDENT IN AN ORGANIZED HEALTH CARE EDUCATION/TRAINING PROGRAM

## 2024-11-13 RX ORDER — LIDOCAINE HYDROCHLORIDE 20 MG/ML
15 SOLUTION OROPHARYNGEAL ONCE
Status: COMPLETED | OUTPATIENT
Start: 2024-11-13 | End: 2024-11-13

## 2024-11-13 RX ORDER — SUCRALFATE 1 G/1
1 TABLET ORAL ONCE
Status: COMPLETED | OUTPATIENT
Start: 2024-11-13 | End: 2024-11-13

## 2024-11-13 RX ADMIN — SUCRALFATE 1 G: 1 TABLET ORAL at 12:11

## 2024-11-13 RX ADMIN — LIDOCAINE HYDROCHLORIDE 15 ML: 20 SOLUTION ORAL at 12:11

## 2024-11-13 NOTE — ED ATTENDING ATTESTATION
11/13/2024  I, Jordan Gonzales MD, saw and evaluated the patient. I have discussed the patient with the resident/non-physician practitioner and agree with the resident's/non-physician practitioner's findings, Plan of Care, and MDM as documented in the resident's/non-physician practitioner's note, except where noted. All available labs and Radiology studies were reviewed.  I was present for key portions of any procedure(s) performed by the resident/non-physician practitioner and I was immediately available to provide assistance.       At this point I agree with the current assessment done in the Emergency Department.  I have conducted an independent evaluation of this patient a history and physical is as follows:    ED Course         Critical Care Time  Procedures    18 yo female with palpitations, acid reflux for few days.  Pt with hx of anemia.  Pt treated for pneumonia few weeks ago.  Pt with no n/v/d, no numbness, tingling, weakness.  Pt seen in ed two days ago and reassured.  Vss, afebrile, lungs cta, rrr, abdomen soft nontender, no neuro deficits.  Labs, EKG, urine preg.

## 2024-11-13 NOTE — Clinical Note
Beatriz Bay was seen and treated in our emergency department on 11/13/2024.                Diagnosis:     Beatriz  is off the rest of the shift today.    She may return on this date:          If you have any questions or concerns, please don't hesitate to call.      Lino Dominguez MD    ______________________________           _______________          _______________  Hospital Representative                              Date                                Time

## 2024-11-13 NOTE — ED PROVIDER NOTES
"Time reflects when diagnosis was documented in both MDM as applicable and the Disposition within this note       Time User Action Codes Description Comment    11/13/2024 12:38 PM Lino Dominguez Add [R42] Lightheadedness     11/13/2024 12:38 PM Lino Dominguez Add [K21.9] GERD (gastroesophageal reflux disease)           ED Disposition       ED Disposition   Discharge    Condition   Stable    Date/Time   Wed Nov 13, 2024 12:38 PM    Comment   Beatriz Bay discharge to home/self care.                   Assessment & Plan       Medical Decision Making  /65   Pulse 103   Temp 97.5 °F (36.4 °C)   Resp 18   LMP 10/16/2024 (Exact Date)   SpO2 97%   Smoking Status Never  .   Physical exam: Patient well-appearing, not in acute distress.  Previous records reviewed.    DDX includes but not limited to: anemia, dehydration, GERD     Plan:   Labs:  - CBC for evaluation of anemia and leukocytosis, ect.  - CMP for evaluation of electrolytes, renal function,  ect.    Imaging:   -Chest x-ray for evaluation of PNA, PNX, pleural effusion, cardiomegaly    Medications:  - Xylocaine swallow, Carafate     Significant findings and further discussion on patient workup noted in ED course above.     Upon re-evaluation GERD sxs resolved, pt still lightheaded.    All labs reviewed and utilized in the medical decision making process  All radiology studies independently viewed by me and interpreted by the radiologist.      Disposition:   I reviewed labs and imaging results with patient and family member who expressed understanding and agrees with plan for discharge. Pt will continue taking her PPI as prescribed.   Strict return precaution discussed.  All questions were answered at this time.    Portions of the record may have been created with voice recognition software. Occasional wrong word or \"sound a like\" substitutions may have occurred due to the inherent limitations of voice recognition software. Read the chart " carefully and recognize, using context, where substitutions have occurred.     Amount and/or Complexity of Data Reviewed  Labs: ordered.    Risk  Prescription drug management.             Medications   sucralfate (CARAFATE) tablet 1 g (1 g Oral Given 11/13/24 1211)   Lidocaine Viscous HCl (XYLOCAINE) 2 % mucosal solution 15 mL (15 mL Swish & Swallow Given 11/13/24 1211)       ED Risk Strat Scores                           SBIRT 22yo+      Flowsheet Row Most Recent Value   Initial Alcohol Screen: US AUDIT-C     1. How often do you have a drink containing alcohol? 0 Filed at: 11/13/2024 1057   2. How many drinks containing alcohol do you have on a typical day you are drinking?  0 Filed at: 11/13/2024 1057   3a. Male UNDER 65: How often do you have five or more drinks on one occasion? 0 Filed at: 11/13/2024 1057   3b. FEMALE Any Age, or MALE 65+: How often do you have 4 or more drinks on one occassion? 0 Filed at: 11/13/2024 1057   Audit-C Score 0 Filed at: 11/13/2024 1057   BAL: How many times in the past year have you...    Used an illegal drug or used a prescription medication for non-medical reasons? Never Filed at: 11/13/2024 1057                            History of Present Illness       Chief Complaint   Patient presents with    Dizziness     Reports dizziness, cp and nausea x 2 days. States she has a hx of anemia.       Past Medical History:   Diagnosis Date    Anemia       History reviewed. No pertinent surgical history.   History reviewed. No pertinent family history.   Social History     Tobacco Use    Smoking status: Never    Smokeless tobacco: Never   Vaping Use    Vaping status: Never Used   Substance Use Topics    Alcohol use: Not Currently    Drug use: Never      E-Cigarette/Vaping    E-Cigarette Use Never User       E-Cigarette/Vaping Substances      I have reviewed and agree with the history as documented.     20 yo F presenting for evaluation of lightheadedness, intermittent palpitations, and GERD  sxs. Pt was seen on 11/10 in the ED for palpitations and was discharged. She states she continues to experience palpitations intermittently since. She began feeling burning in her chest, c/w her previous GERD sxs and began taking ppi. Today, she began feeling lightheaded so she came in for further evaluation. Pt is currently menstruating, flow is typical. No fevers/chills, SOB         Review of Systems   Neurological:  Positive for light-headedness.           Objective       ED Triage Vitals   Temperature Pulse Blood Pressure Respirations SpO2 Patient Position - Orthostatic VS   11/13/24 1056 11/13/24 1056 11/13/24 1057 11/13/24 1056 11/13/24 1056 11/13/24 1056   97.5 °F (36.4 °C) 103 119/65 18 97 % Lying      Temp src Heart Rate Source BP Location FiO2 (%) Pain Score    -- -- 11/13/24 1056 -- --      Right arm        Vitals      Date and Time Temp Pulse SpO2 Resp BP Pain Score FACES Pain Rating User   11/13/24 1057 -- -- -- -- 119/65 -- -- EM   11/13/24 1056 97.5 °F (36.4 °C) 103 97 % 18 -- -- -- EM            Physical Exam  Vitals reviewed.   Constitutional:       General: She is not in acute distress.     Appearance: Normal appearance. She is normal weight.   HENT:      Head: Normocephalic and atraumatic.      Right Ear: External ear normal.      Left Ear: External ear normal.      Nose: Nose normal.      Mouth/Throat:      Mouth: Mucous membranes are moist.      Pharynx: Oropharynx is clear.   Eyes:      Extraocular Movements: Extraocular movements intact.      Conjunctiva/sclera: Conjunctivae normal.      Pupils: Pupils are equal, round, and reactive to light.   Cardiovascular:      Rate and Rhythm: Regular rhythm. Tachycardia present.      Pulses: Normal pulses.      Heart sounds: Normal heart sounds.   Pulmonary:      Effort: Pulmonary effort is normal.      Breath sounds: Normal breath sounds.   Abdominal:      General: Abdomen is flat.      Palpations: Abdomen is soft.      Tenderness: There is no abdominal  tenderness.   Musculoskeletal:         General: Normal range of motion.      Cervical back: Normal range of motion.   Skin:     General: Skin is warm and dry.      Capillary Refill: Capillary refill takes less than 2 seconds.   Neurological:      General: No focal deficit present.      Mental Status: She is alert and oriented to person, place, and time.         Results Reviewed       Procedure Component Value Units Date/Time    Basic metabolic panel [[id number]]  (Abnormal) Collected: 11/13/24 1206    Lab Status: Final result Specimen: Blood from Arm, Left Updated: 11/13/24 1237     Sodium 138 mmol/L      Potassium 4.2 mmol/L      Chloride 104 mmol/L      CO2 26 mmol/L      ANION GAP 8 mmol/L      BUN 9 mg/dL      Creatinine 0.53 mg/dL      Glucose 87 mg/dL      Calcium 9.8 mg/dL      eGFR 136 ml/min/1.73sq m     Narrative:      National Kid[employer] Disease Foundation guidelines for Chronic Kidney Disease (CKD):     Stage 1 with normal or high GFR (GFR > 90 mL/min/1.73 square meters)    Stage 2 Mild CKD (GFR = 60-89 mL/min/1.73 square meters)    Stage 3A Moderate CKD (GFR = 45-59 mL/min/1.73 square meters)    Stage 3B Moderate CKD (GFR = 30-44 mL/min/1.73 square meters)    Stage 4 Severe CKD (GFR = 15-29 mL/min/1.73 square meters)    Stage 5 End Stage CKD (GFR <15 mL/min/1.73 square meters)  Note: GFR calculation is accurate only with a steady state creatinine    Urine Microscopic [[id number]]  (Abnormal) Collected: 11/13/24 1202    Lab Status: Final result Specimen: Urine, Clean Catch Updated: 11/13/24 1231     RBC, UA Innumerable /hpf      WBC, UA 20-30 /hpf      Epithelial Cells None Seen /hpf      Bacteria, UA None Seen /hpf      MUCUS THREADS Moderate    Urine culture [[id number]] Collected: 11/13/24 1202    Lab Status: In process Specimen: Urine, Clean Catch Updated: 11/13/24 1231    CBC and differential [[id number]] Collected: 11/13/24 1206    Lab Status: Final result Specimen: Blood from Arm, Left Updated: 11/13/24  1225     WBC 6.52 Thousand/uL      RBC 4.85 Million/uL      Hemoglobin 14.1 g/dL      Hematocrit 43.8 %      MCV 90 fL      MCH 29.1 pg      MCHC 32.2 g/dL      RDW 12.9 %      MPV 10.0 fL      Platelets 286 Thousands/uL      nRBC 0 /100 WBCs      Segmented % 69 %      Immature Grans % 0 %      Lymphocytes % 23 %      Monocytes % 7 %      Eosinophils Relative 1 %      Basophils Relative 0 %      Absolute Neutrophils 4.50 Thousands/µL      Absolute Immature Grans 0.02 Thousand/uL      Absolute Lymphocytes 1.47 Thousands/µL      Absolute Monocytes 0.48 Thousand/µL      Eosinophils Absolute 0.03 Thousand/µL      Basophils Absolute 0.02 Thousands/µL     UA w Reflex to Microscopic w Reflex to Culture [[id number]]  (Abnormal) Collected: 11/13/24 1202    Lab Status: Final result Specimen: Urine, Clean Catch Updated: 11/13/24 1218     Color, UA Light Orange     Clarity, UA Turbid     Specific Gravity, UA 1.029     pH, UA 5.5     Leukocytes, UA Small     Nitrite, UA Negative     Protein, UA 70 (1+) mg/dl      Glucose, UA Negative mg/dl      Ketones, UA >=150 (4+) mg/dl      Urobilinogen, UA <2.0 mg/dl      Bilirubin, UA Negative     Occult Blood, UA Large            No orders to display       Procedures    ED Medication and Procedure Management   Prior to Admission Medications   Prescriptions Last Dose Informant Patient Reported? Taking?   dicyclomine (BENTYL) 20 mg tablet   No No   Sig: Take 1 tablet (20 mg total) by mouth 2 (two) times a day   ondansetron (ZOFRAN) 4 mg tablet   No No   Sig: Take 1 tablet (4 mg total) by mouth every 6 (six) hours      Facility-Administered Medications: None     Discharge Medication List as of 11/13/2024 12:41 PM        CONTINUE these medications which have NOT CHANGED    Details   dicyclomine (BENTYL) 20 mg tablet Take 1 tablet (20 mg total) by mouth 2 (two) times a day, Starting Sun 11/10/2024, Normal      ondansetron (ZOFRAN) 4 mg tablet Take 1 tablet (4 mg total) by mouth every 6 (six)  hours, Starting Fri 10/25/2024, Normal           No discharge procedures on file.  ED SEPSIS DOCUMENTATION   Time reflects when diagnosis was documented in both MDM as applicable and the Disposition within this note       Time User Action Codes Description Comment    11/13/2024 12:38 PM Lino Dominguez Add [R42] Lightheadedness     11/13/2024 12:38 PM Lino Dominguez Add [K21.9] GERD (gastroesophageal reflux disease)                  Lino Dominguez MD  11/13/24 6015

## 2024-11-13 NOTE — DISCHARGE INSTRUCTIONS
Please follow-up with your PCP, if you do not have one the information for the clinic has been provided.    Continue taking medications as prescribed    Call your doctor or return to the emergency department if you experience worsening or concerning symptoms

## 2024-11-14 LAB
ATRIAL RATE: 93 BPM
P AXIS: 26 DEGREES
PR INTERVAL: 124 MS
QRS AXIS: 38 DEGREES
QRSD INTERVAL: 76 MS
QT INTERVAL: 340 MS
QTC INTERVAL: 423 MS
T WAVE AXIS: 33 DEGREES
VENTRICULAR RATE: 93 BPM

## 2024-11-14 PROCEDURE — 93010 ELECTROCARDIOGRAM REPORT: CPT | Performed by: INTERNAL MEDICINE

## 2024-11-15 LAB
BACTERIA UR CULT: NORMAL
BACTERIA UR CULT: NORMAL

## 2024-11-16 ENCOUNTER — APPOINTMENT (EMERGENCY)
Dept: RADIOLOGY | Facility: HOSPITAL | Age: 19
End: 2024-11-16
Payer: COMMERCIAL

## 2024-11-16 ENCOUNTER — HOSPITAL ENCOUNTER (EMERGENCY)
Facility: HOSPITAL | Age: 19
Discharge: HOME | End: 2024-11-16
Attending: EMERGENCY MEDICINE | Admitting: EMERGENCY MEDICINE
Payer: COMMERCIAL

## 2024-11-16 VITALS
DIASTOLIC BLOOD PRESSURE: 67 MMHG | BODY MASS INDEX: 31.48 KG/M2 | TEMPERATURE: 97.8 F | WEIGHT: 183.42 LBS | RESPIRATION RATE: 18 BRPM | HEART RATE: 106 BPM | OXYGEN SATURATION: 96 % | SYSTOLIC BLOOD PRESSURE: 128 MMHG

## 2024-11-16 DIAGNOSIS — R42 LIGHTHEADEDNESS: Primary | ICD-10-CM

## 2024-11-16 LAB
ATRIAL RATE: 107 BPM
BACTERIA UR QL AUTO: ABNORMAL /HPF
BILIRUB UR QL STRIP: NEGATIVE
CLARITY UR: CLEAR
COLOR UR: ABNORMAL
GLUCOSE UR STRIP-MCNC: NEGATIVE MG/DL
HGB UR QL STRIP.AUTO: ABNORMAL
KETONES UR STRIP-MCNC: ABNORMAL MG/DL
LEUKOCYTE ESTERASE UR QL STRIP: NEGATIVE
MUCOUS THREADS UR QL AUTO: ABNORMAL
NITRITE UR QL STRIP: NEGATIVE
NON-SQ EPI CELLS URNS QL MICRO: ABNORMAL /HPF
P AXIS: 58 DEGREES
PH UR STRIP.AUTO: 5.5 [PH]
PR INTERVAL: 122 MS
PROT UR STRIP-MCNC: ABNORMAL MG/DL
QRS AXIS: 75 DEGREES
QRSD INTERVAL: 84 MS
QT INTERVAL: 314 MS
QTC INTERVAL: 419 MS
RBC #/AREA URNS AUTO: ABNORMAL /HPF
SP GR UR STRIP.AUTO: 1.02 (ref 1–1.03)
T WAVE AXIS: 29 DEGREES
UROBILINOGEN UR STRIP-ACNC: <2 MG/DL
VENTRICULAR RATE: 107 BPM
WBC #/AREA URNS AUTO: ABNORMAL /HPF

## 2024-11-16 PROCEDURE — 93005 ELECTROCARDIOGRAM TRACING: CPT

## 2024-11-16 PROCEDURE — 99284 EMERGENCY DEPT VISIT MOD MDM: CPT | Performed by: EMERGENCY MEDICINE

## 2024-11-16 PROCEDURE — 71046 X-RAY EXAM CHEST 2 VIEWS: CPT

## 2024-11-16 PROCEDURE — 93010 ELECTROCARDIOGRAM REPORT: CPT | Performed by: INTERNAL MEDICINE

## 2024-11-16 PROCEDURE — 99284 EMERGENCY DEPT VISIT MOD MDM: CPT

## 2024-11-16 PROCEDURE — 81001 URINALYSIS AUTO W/SCOPE: CPT

## 2024-11-16 RX ORDER — DICYCLOMINE HCL 20 MG
20 TABLET ORAL ONCE
Status: COMPLETED | OUTPATIENT
Start: 2024-11-16 | End: 2024-11-16

## 2024-11-16 RX ADMIN — DICYCLOMINE HYDROCHLORIDE 20 MG: 20 TABLET ORAL at 14:34

## 2024-11-16 NOTE — DISCHARGE INSTRUCTIONS
You were evaluated in the emergency department for lightheadedness.  Your chest x-ray was unremarkable.  Your urine did not show signs of urinary tract infection.  Return to the emergency department if you pass out, you develop worsening chest pains, worsening shortness of breath, or generally feel worse.  We are providing you with a referral for a primary care physician in the area.  Please follow-up with them.

## 2024-11-16 NOTE — ED ATTENDING ATTESTATION
11/16/2024  I, Danny Lubin DO, saw and evaluated the patient. I have discussed the patient with the resident/non-physician practitioner and agree with the resident's/non-physician practitioner's findings, Plan of Care, and MDM as documented in the resident's/non-physician practitioner's note, except where noted. All available labs and Radiology studies were reviewed.  I was present for key portions of any procedure(s) performed by the resident/non-physician practitioner and I was immediately available to provide assistance.       At this point I agree with the current assessment done in the Emergency Department.  I have conducted an independent evaluation of this patient a history and physical is as follows:    ED Course  ED Course as of 11/16/24 1305   Sat Nov 16, 2024   1300 19F with noted dizziness and lightheadedness, noted with vomiting recent dx of gerd, seen in the ED with eval, labs urine and dc home.     Hx of recent pneumonia 1 month prior.   Mild suprapubic discomfort noted, recent ua negative at that time    Tachycardia on exam, rest is unremarkable at this time     Ddx- uti, post viral syndrome, dehydration, GERD     Plan recheck urine, gerd tx. Re-eval          Critical Care Time  Procedures

## 2024-11-16 NOTE — ED PROVIDER NOTES
Time reflects when diagnosis was documented in both MDM as applicable and the Disposition within this note       Time User Action Codes Description Comment    11/16/2024  2:02 PM Oli Herrera Add [R42] Lightheadedness           ED Disposition       ED Disposition   Discharge    Condition   Stable    Date/Time   Sat Nov 16, 2024  2:00 PM    Comment   [place] discharge to home/self care.                   Assessment & Plan       Medical Decision Making  19-year-old female presents to ED for evaluation of lightheadedness.  On physical exam, patient not in acute distress.  She is not ill-appearing.  Heart regular rate and rhythm.  Lungs clear to auscultation bilaterally.  Mild suprapubic tenderness to palpation.  previous ED visit records reviewed.  Differential diagnosis: Rule out arrhythmia, anemia, electrolyte abnormality, UTI, pneumonia  Plan: Will order CBC, CMP, urinalysis.  Will order chest x-ray to evaluate for pneumonia, pleural effusion.  Reassessment: Chest x-ray showed no acute cardiopulmonary disease.    Amount and/or Complexity of Data Reviewed  Radiology: ordered.    Risk  Prescription drug management.        ED Course as of 11/16/24 2156   Sat Nov 16, 2024   1409 I discussed urine and chest x-ray findings with the patient.  Will discharge to home.  Patient stable at time of discharge.  All patient questions answered prior to discharge.  I instructed the patient to follow-up with a primary care doctor in the area.  I provided a referral for one.  Strict return precautions discussed with patient.   6953 Patient reports intermittent periumbilical pain lasting seconds at a time, rated 4 out of 10 in severity.  Will trial Bentyl.       Medications   dicyclomine (BENTYL) tablet 20 mg (20 mg Oral Given 11/16/24 1434)       ED Risk Strat Scores                                               History of Present Illness       Chief Complaint   Patient presents with    Dizziness     Pt c/o  "dizziness and lightheadedness since Wednesday, was evaluated here for same problem, -CP/SOB at this time, reports having CP intermittently over last few days        Past Medical History:   Diagnosis Date    Anemia       History reviewed. No pertinent surgical history.   History reviewed. No pertinent family history.   Social History     Tobacco Use    Smoking status: Never    Smokeless tobacco: Never   Vaping Use    Vaping status: Never Used   Substance Use Topics    Alcohol use: Not Currently    Drug use: Never      E-Cigarette/Vaping    E-Cigarette Use Never User       E-Cigarette/Vaping Substances      I have reviewed and agree with the history as documented.     19-year-old female with past medical history GERD presents to ED for evaluation of lightheadedness.  Patient has had lightheadedness for several days.  She states that she had intermittent chest pain with \"acidic \"vomitus.  She states that this last occurred yesterday.  She has not vomited or had chest pain today.  She has been taking over-the-counter reflux medication at home.  She reports that she is nearing the end of her menstrual cycle.  She currently denies fever, chills, cough, chest pain, shortness of breath.  She notes intermittent suprapubic abdominal pain but denies hematuria, dysuria.  Patient was evaluated at this facility on 11/13 for similar complaints and was discharged home.        Review of Systems   Constitutional:  Negative for chills and fever.   Respiratory:  Negative for cough and shortness of breath.    Cardiovascular:  Positive for chest pain (Yesterday).   Gastrointestinal:  Positive for abdominal pain. Negative for diarrhea, nausea and vomiting.   Genitourinary:  Negative for difficulty urinating, dysuria and frequency.   Skin:  Negative for color change.   Neurological:  Positive for light-headedness. Negative for headaches.           Objective       ED Triage Vitals [11/16/24 1221]   Temperature Pulse Blood Pressure " Respirations SpO2 Patient Position - Orthostatic VS   97.8 °F (36.6 °C) (!) 106 128/67 18 96 % Sitting      Temp Source Heart Rate Source BP Location FiO2 (%) Pain Score    Tympanic Monitor Left arm -- --      Vitals      Date and Time Temp Pulse SpO2 Resp BP Pain Score FACES Pain Rating User   11/16/24 1221 97.8 °F (36.6 °C) 106 96 % 18 128/67 -- -- MT            Physical Exam  Vitals and nursing note reviewed.   Constitutional:       General: She is not in acute distress.     Appearance: Normal appearance. She is normal weight. She is not ill-appearing, toxic-appearing or diaphoretic.   HENT:      Head: Normocephalic and atraumatic.      Nose: No rhinorrhea.      Mouth/Throat:      Mouth: Mucous membranes are moist.   Eyes:      Extraocular Movements: Extraocular movements intact.      Conjunctiva/sclera: Conjunctivae normal.   Cardiovascular:      Rate and Rhythm: Regular rhythm. Tachycardia present.      Pulses: Normal pulses.      Heart sounds: Normal heart sounds.   Pulmonary:      Effort: Pulmonary effort is normal.      Breath sounds: Normal breath sounds.   Abdominal:      General: Abdomen is flat. There is no distension.      Palpations: Abdomen is soft.      Tenderness: There is abdominal tenderness (Suprapubic). There is no guarding or rebound.   Musculoskeletal:      Cervical back: Neck supple.   Skin:     General: Skin is warm and dry.   Neurological:      Mental Status: She is alert and oriented to person, place, and time.         Results Reviewed       Procedure Component Value Units Date/Time    Urine Microscopic [[id number]]  (Abnormal) Collected: 11/16/24 1312    Lab Status: Final result Specimen: Urine, Clean Catch Updated: 11/16/24 1335     RBC, UA Innumerable /hpf      WBC, UA 2-4 /hpf      Epithelial Cells Occasional /hpf      Bacteria, UA None Seen /hpf      MUCUS THREADS Occasional    UA w Reflex to Microscopic w Reflex to Culture [[id number]]  (Abnormal) Collected: 11/16/24 1312    Lab  Status: Final result Specimen: Urine, Clean Catch Updated: 11/16/24 1327     Color, UA Light Yellow     Clarity, UA Clear     Specific Gravity, UA 1.018     pH, UA 5.5     Leukocytes, UA Negative     Nitrite, UA Negative     Protein, UA Trace mg/dl      Glucose, UA Negative mg/dl      Ketones, UA >=150 (4+) mg/dl      Urobilinogen, UA <2.0 mg/dl      Bilirubin, UA Negative     Occult Blood, UA Large            XR chest 2 views   Final Interpretation by Adalberto Van MD (11/16 2865)      No acute cardiopulmonary disease.            Workstation performed: [id number]             Procedures    ED Medication and Procedure Management   Prior to Admission Medications   Prescriptions Last Dose Informant Patient Reported? Taking?   dicyclomine (BENTYL) 20 mg tablet   No No   Sig: Take 1 tablet (20 mg total) by mouth 2 (two) times a day   ondansetron (ZOFRAN) 4 mg tablet   No No   Sig: Take 1 tablet (4 mg total) by mouth every 6 (six) hours      Facility-Administered Medications: None     Discharge Medication List as of 11/16/2024  2:04 PM        CONTINUE these medications which have NOT CHANGED    Details   dicyclomine (BENTYL) 20 mg tablet Take 1 tablet (20 mg total) by mouth 2 (two) times a day, Starting Sun 11/10/2024, Normal      ondansetron (ZOFRAN) 4 mg tablet Take 1 tablet (4 mg total) by mouth every 6 (six) hours, Starting Fri 10/25/2024, Normal           No discharge procedures on file.  ED SEPSIS DOCUMENTATION   Time reflects when diagnosis was documented in both MDM as applicable and the Disposition within this note       Time User Action Codes Description Comment    11/16/2024  2:02 PM Oli Herrera Add [R42] Lightheadedness                  Oli Herrera,   11/16/24 2022